# Patient Record
Sex: MALE | Race: WHITE | NOT HISPANIC OR LATINO | Employment: OTHER | URBAN - METROPOLITAN AREA
[De-identification: names, ages, dates, MRNs, and addresses within clinical notes are randomized per-mention and may not be internally consistent; named-entity substitution may affect disease eponyms.]

---

## 2021-08-09 ENCOUNTER — HISTORICAL (OUTPATIENT)
Dept: ADMINISTRATIVE | Facility: HOSPITAL | Age: 61
End: 2021-08-09

## 2021-08-09 LAB
ABS NEUT (OLG): 5.84 X10(3)/MCL (ref 2.1–9.2)
ALBUMIN SERPL-MCNC: 3.8 GM/DL (ref 3.4–4.8)
ALBUMIN/GLOB SERPL: 1.2 RATIO (ref 1.1–2)
ALP SERPL-CCNC: 90 UNIT/L (ref 40–150)
ALT SERPL-CCNC: 29 UNIT/L (ref 0–55)
AST SERPL-CCNC: 22 UNIT/L (ref 5–34)
BASOPHILS # BLD AUTO: 0.1 X10(3)/MCL (ref 0–0.2)
BASOPHILS NFR BLD AUTO: 1 %
BILIRUB SERPL-MCNC: 0.4 MG/DL
BILIRUBIN DIRECT+TOT PNL SERPL-MCNC: 0.1 MG/DL (ref 0–0.5)
BILIRUBIN DIRECT+TOT PNL SERPL-MCNC: 0.3 MG/DL (ref 0–0.8)
BUN SERPL-MCNC: 18.7 MG/DL (ref 8.4–25.7)
CALCIUM SERPL-MCNC: 9.2 MG/DL (ref 8.8–10)
CHLORIDE SERPL-SCNC: 104 MMOL/L (ref 98–107)
CHOLEST SERPL-MCNC: 214 MG/DL
CHOLEST/HDLC SERPL: 6 {RATIO} (ref 0–5)
CO2 SERPL-SCNC: 34 MMOL/L (ref 23–31)
CREAT SERPL-MCNC: 0.89 MG/DL (ref 0.73–1.18)
DEPRECATED CALCIDIOL+CALCIFEROL SERPL-MC: 56.5 NG/ML (ref 30–80)
EOSINOPHIL # BLD AUTO: 0.5 X10(3)/MCL (ref 0–0.9)
EOSINOPHIL NFR BLD AUTO: 6 %
ERYTHROCYTE [DISTWIDTH] IN BLOOD BY AUTOMATED COUNT: 13.5 % (ref 11.5–14.5)
EST CREAT CLEARANCE SER (OHS): 72.54 ML/MIN
EST. AVERAGE GLUCOSE BLD GHB EST-MCNC: 111.2 MG/DL
GLOBULIN SER-MCNC: 3.3 GM/DL (ref 2.4–3.5)
GLUCOSE SERPL-MCNC: 84 MG/DL (ref 82–115)
HBA1C MFR BLD: 5.5 %
HCT VFR BLD AUTO: 46.5 % (ref 40–51)
HDLC SERPL-MCNC: 38 MG/DL (ref 35–60)
HGB BLD-MCNC: 15.5 GM/DL (ref 13.5–17.5)
IMM GRANULOCYTES # BLD AUTO: 0.06 10*3/UL
IMM GRANULOCYTES NFR BLD AUTO: 1 %
LDLC SERPL CALC-MCNC: 97 MG/DL (ref 50–140)
LYMPHOCYTES # BLD AUTO: 1.4 X10(3)/MCL (ref 0.6–4.6)
LYMPHOCYTES NFR BLD AUTO: 16 %
MCH RBC QN AUTO: 31.3 PG (ref 26–34)
MCHC RBC AUTO-ENTMCNC: 33.3 GM/DL (ref 31–37)
MCV RBC AUTO: 93.9 FL (ref 80–100)
MONOCYTES # BLD AUTO: 0.8 X10(3)/MCL (ref 0.1–1.3)
MONOCYTES NFR BLD AUTO: 9 %
NEUTROPHILS # BLD AUTO: 5.84 X10(3)/MCL (ref 2.1–9.2)
NEUTROPHILS NFR BLD AUTO: 67 %
NRBC BLD AUTO-RTO: 0 % (ref 0–0.2)
PLATELET # BLD AUTO: 156 X10(3)/MCL (ref 130–400)
PMV BLD AUTO: 9.4 FL (ref 7.4–10.4)
POTASSIUM SERPL-SCNC: 4.2 MMOL/L (ref 3.5–5.1)
PROT SERPL-MCNC: 7.1 GM/DL (ref 5.8–7.6)
PSA SERPL-MCNC: 2.7 NG/ML
RBC # BLD AUTO: 4.95 X10(6)/MCL (ref 4.5–5.9)
SODIUM SERPL-SCNC: 141 MMOL/L (ref 136–145)
TRIGL SERPL-MCNC: 395 MG/DL (ref 34–140)
TSH SERPL-ACNC: 1.5 UIU/ML (ref 0.35–4.94)
VLDLC SERPL CALC-MCNC: 79 MG/DL
WBC # SPEC AUTO: 8.7 X10(3)/MCL (ref 4.5–11)

## 2022-04-10 ENCOUNTER — HISTORICAL (OUTPATIENT)
Dept: ADMINISTRATIVE | Facility: HOSPITAL | Age: 62
End: 2022-04-10
Payer: MEDICAID

## 2022-04-26 VITALS
DIASTOLIC BLOOD PRESSURE: 78 MMHG | WEIGHT: 135.56 LBS | HEIGHT: 64 IN | SYSTOLIC BLOOD PRESSURE: 116 MMHG | BODY MASS INDEX: 23.14 KG/M2 | OXYGEN SATURATION: 97 %

## 2022-05-03 NOTE — HISTORICAL [facility] CERNER
This is a historical note converted from Noé. Formatting and pictures may have been removed.  Please reference Noé for original formatting and attached multimedia. Chief Complaint  3 month follow up COPD, HTN. c/o fatigue. c/o SOB  History of Present Illness  59 yo male with sig pmh of aortic regurg, HTN, COPD, Sleep apnea, aneurysm on aortic valve here for a follow up  ?  patient has no concerns or complaints today  states that he was not able to get his blood work done the last visit and would like to get them today  ?   colon cancer screening, does not want?coloscopy,? would like FIT  does not want shingles, tdap, or covid vaccine  ?   saw pulmonology 3 weeks ago, no changes in medication changes, continues to have SOB but remains very active at home, mows his yard with a push mower and works out with his step machine  ?  pulmonologist- Anisha;  Psychiatrist- James;  Cardiology: Janae  Review of Systems  CONSTITUTIONAL: No weight loss, fever, chills,?or weakness. ?  HEENT: Eyes: No visual loss or blurred vision.  Ears, Nose, Throat: No congestion, runny nose or sore throat.?  SKIN: No rash or itching.?  CARDIOVASCULAR: No chest pain, chest pressure, or chest discomfort. No palpitations.?  RESPIRATORY:?+ shortness of breath, chronic cough  GASTROINTESTINAL: No nausea, vomiting or diarrhea. No abdominal pain, incontinence  GENITOURINARY: No pain, burning, or increased frequency or urgency?on urination.  Physical Exam  Vitals & Measurements  T:?37.1? ?C (Oral)? HR:?72(Peripheral)? RR:?19? BP:?100/63? SpO2:?96%?  HT:?163.00?cm? WT:?60.000?kg? BMI:?22.58?  General: appears well, in no acute distress  Eye: no scleral icterus  HENT: MMM  Neck: supple, no carotid bruits  Respiratory: clear to auscultation bilaterally, nonlabored respirations  Cardiovascular: regular rate and rhythm without murmurs or?gallops, no edema in bilateral lower extremities  Assessment/Plan  1.?HTN (hypertension)?I10  -continue  lisinopril 5mg daily, furosemide 20mg daily, Metoprolol tartrate 25mg BID  -continue following cardiology Dr. Cardoso  Ordered:  Comprehensive Metabolic Panel, Routine collect, 08/09/21 9:26:00 CDT, Blood, Stop date 08/09/21 9:26:00 CDT, Lab Collect, Venous Draw, HTN (hypertension), 08/09/21 9:13:00 CDT  Hemoglobin A1C UH, Routine collect, 08/09/21 9:26:00 CDT, Blood, Stop date 08/09/21 9:26:00 CDT, Lab Collect, Venous Draw, HTN (hypertension), 08/09/21 9:13:00 CDT  Lipid Panel, Routine collect, 08/09/21 9:26:00 CDT, Blood, Stop date 08/09/21 9:26:00 CDT, Lab Collect, Venous Draw, HTN (hypertension), 08/09/21 9:13:00 CDT  Prostate Specific Antigen, Routine collect, 08/09/21 9:26:00 CDT, Blood, Stop date 08/09/21 9:26:00 CDT, Lab Collect, Venous Draw, HTN (hypertension), 08/09/21 9:13:00 CDT  Thyroid Stimulating Hormone, Routine collect, 08/09/21 9:26:00 CDT, Blood, Stop date 08/09/21 9:26:00 CDT, Lab Collect, Venous Draw, HTN (hypertension), 08/09/21 9:13:00 CDT  Vitamin D, 25-Hydroxy Level, Routine collect, 08/09/21 9:26:00 CDT, Blood, Stop date 08/09/21 9:26:00 CDT, Lab Collect, Venous Draw, HTN (hypertension), 08/09/21 9:13:00 CDT  ?  3.?Sleep apnea?G47.30  ?-continue CPA  Ordered:  Clinic Follow up, *Est. 11/09/21 8:00:00 CST, Order for future visit, Sleep apnea, [facility]  ?  4.?Colon cancer screening?Z12.11  ?-FIT given to patient today  Ordered:  Occult Blood Fecal Immunoassay, Routine collect, Stool, Order for future visit, 08/09/21 9:17:00 CDT, Stop date 08/09/21 9:17:00 CDT, Nurse collect, Colon cancer screening  ?  5.?Prostate cancer screening?Z12.5  ?-PSA today  ?  RTC in 3 months with PCP Dr. Cali  ?  Stella Silva MD  [facility] -2  Referrals  Clinic Follow up, *Est. 11/09/21 8:00:00 CST, Order for future visit, Sleep apnea, [facility]   Problem List/Past Medical History  Ongoing  Aortic regurgitation  COPD type A  Establishing care with new doctor,  encounter for  HTN (hypertension)  Sleep apnea  Historical  No qualifying data  Procedure/Surgical History  Tonsils and adenoids   Medications  albuterol 90 mcg/inh inhalation aerosol, 1 puff(s), INH, Once, PRN  Lasix 20 mg oral tablet, 20 mg= 1 tab(s), Oral, Daily  lisinopril 5 mg oral tablet, 5 mg= 1 tab(s), Oral, Daily  LORazepam 1 mg oral tablet, 1 mg= 1 tab(s), Oral, BID, PRN  metoprolol tartrate 25 mg oral tab, 25 mg= 1 tab(s), Oral, BID  Spiriva HandiHaler 18 mcg inhalation capsule, 18 mcg= 1 cap(s), INH, Daily  Symbicort 160 mcg-4.5 mcg/inh inhalation aerosol, 2 puff(s), INH, BID  traZODone 100 mg oral tablet, 100 mg= 1 tab(s), Oral, Once a day (at bedtime), PRN  Allergies  No Known Medication Allergies  Social History  Alcohol  Past, 05/06/2021  Nutrition/Health  Regular, Good, 05/06/2021  Tobacco  Former smoker, quit more than 30 days ago, N/A, Household tobacco concerns: No. Smokeless Tobacco Use: Never., 08/09/2021  Former smoker, quit more than 30 days ago, No, 05/06/2021      Faculty addendum: Patient discussed with resident. ?Chart was reviewed including vitals, labs, etc. Care provided reasonable and necessary.?I participated in the management of the patient and was immediately available throughout the encounter. Services were furnished in a primary care center located in the outpatient department of a [facility] hospital. I agree with the residents findings and plan as documented in the residents note.?

## 2022-05-30 ENCOUNTER — OFFICE VISIT (OUTPATIENT)
Dept: FAMILY MEDICINE | Facility: CLINIC | Age: 62
End: 2022-05-30
Payer: MEDICAID

## 2022-05-30 VITALS
OXYGEN SATURATION: 96 % | DIASTOLIC BLOOD PRESSURE: 67 MMHG | WEIGHT: 138.25 LBS | RESPIRATION RATE: 18 BRPM | SYSTOLIC BLOOD PRESSURE: 105 MMHG | HEART RATE: 72 BPM | BODY MASS INDEX: 23.6 KG/M2

## 2022-05-30 DIAGNOSIS — G47.30 SLEEP APNEA, UNSPECIFIED TYPE: ICD-10-CM

## 2022-05-30 DIAGNOSIS — N39.0 URINARY TRACT INFECTION WITHOUT HEMATURIA, SITE UNSPECIFIED: Primary | ICD-10-CM

## 2022-05-30 DIAGNOSIS — I10 HYPERTENSION, UNSPECIFIED TYPE: ICD-10-CM

## 2022-05-30 PROBLEM — I35.1 AORTIC VALVE REGURGITATION: Status: ACTIVE | Noted: 2022-05-30

## 2022-05-30 PROBLEM — J43.9 PULMONARY EMPHYSEMA: Status: ACTIVE | Noted: 2022-05-30

## 2022-05-30 PROBLEM — I25.10 CORONARY ARTERY DISEASE INVOLVING NATIVE CORONARY ARTERY OF NATIVE HEART WITHOUT ANGINA PECTORIS: Status: ACTIVE | Noted: 2020-01-29

## 2022-05-30 PROBLEM — I42.0 DILATED CARDIOMYOPATHY: Status: ACTIVE | Noted: 2020-01-29

## 2022-05-30 PROBLEM — I71.9 AORTIC ANEURYSM: Status: ACTIVE | Noted: 2020-01-27

## 2022-05-30 PROBLEM — I50.9 CHF (CONGESTIVE HEART FAILURE): Status: ACTIVE | Noted: 2020-01-27

## 2022-05-30 PROCEDURE — 99213 OFFICE O/P EST LOW 20 MIN: CPT | Mod: PBBFAC

## 2022-05-30 RX ORDER — METOPROLOL TARTRATE 25 MG/1
25 TABLET, FILM COATED ORAL
COMMUNITY
Start: 2021-05-06 | End: 2024-03-07

## 2022-05-30 RX ORDER — TRAZODONE HYDROCHLORIDE 100 MG/1
100 TABLET ORAL
COMMUNITY
Start: 2021-05-06 | End: 2023-01-05

## 2022-05-30 RX ORDER — LORAZEPAM 1 MG/1
1 TABLET ORAL
COMMUNITY
Start: 2021-05-06 | End: 2024-03-07

## 2022-05-30 RX ORDER — ALBUTEROL SULFATE 90 UG/1
AEROSOL, METERED RESPIRATORY (INHALATION)
COMMUNITY
Start: 2021-05-06

## 2022-05-30 RX ORDER — LISINOPRIL 5 MG/1
5 TABLET ORAL
COMMUNITY
Start: 2021-05-06

## 2022-05-30 RX ORDER — TIOTROPIUM BROMIDE 18 UG/1
18 CAPSULE ORAL; RESPIRATORY (INHALATION)
COMMUNITY
Start: 2021-05-06 | End: 2024-03-07

## 2022-05-30 RX ORDER — BUDESONIDE AND FORMOTEROL FUMARATE DIHYDRATE 160; 4.5 UG/1; UG/1
AEROSOL RESPIRATORY (INHALATION)
COMMUNITY
Start: 2021-05-06

## 2022-05-30 RX ORDER — FUROSEMIDE 20 MG/1
20 TABLET ORAL
COMMUNITY
Start: 2021-05-06

## 2022-05-30 NOTE — PROGRESS NOTES
Subjective:       Patient ID: Lazaro Pacheco is a 61 y.o. male.    Chief Complaint: Follow-up (UTI)    HPI   UT/epididymitis: had enlarged testicle, went to urgent care and [facility], prescribed bactrim which caused him to swell in his legs, was switched to Cephalexin which he completed, has referral into urology placed by Fiordaliza    HTN: taking medications as prescribed  Sleep apnea: wear cpap nightly without issues    Pulmonologist- Anisha  Psychiatrist- [place]  Cardiology: Janae    Review of Systems   Constitutional: Negative for activity change and unexpected weight change.   HENT: Negative for hearing loss, rhinorrhea and trouble swallowing.    Eyes: Negative for discharge and visual disturbance.   Respiratory: Negative for chest tightness and wheezing.    Cardiovascular: Negative for chest pain and palpitations.   Gastrointestinal: Negative for blood in stool, constipation, diarrhea and vomiting.   Endocrine: Negative for polydipsia and polyuria.   Genitourinary: Negative for difficulty urinating, hematuria and urgency.   Musculoskeletal: Negative for arthralgias, joint swelling and neck pain.   Neurological: Negative for weakness and headaches.   Psychiatric/Behavioral: Negative for confusion and dysphoric mood.         Objective:      Blood pressure 105/67, pulse 72, resp. rate 18, weight 62.7 kg (138 lb 3.7 oz), SpO2 96 %.   Physical Exam    General: appears well, in no acute distress  Respiratory: clear to auscultation bilaterally, nonlabored respirations  Cardiovascular: regular rate and rhythm without murmurs, no pretibial edema  Gastrointestinal: soft, non-tender, non-distended, bowel sounds present    Assessment/Plan:  Problem List Items Addressed This Visit        Cardiac/Vascular    Hypertension       Other    Sleep apnea      Other Visit Diagnoses     Urinary tract infection without hematuria, site unspecified    -  Primary         UTI symptoms resolving, referral into urology placed with  Fiordaliza  Medications prescribed by specialist  Wearing CPAP nightly, no issues    RTC 3mo    Adwoa Cali DO  [employer]  Resident HO-3

## 2022-07-25 NOTE — PROGRESS NOTES
Discussed pt's Sx and plan of care at length w/ patient. ACC RN not able to exactly confirm source of discomfort - pt agrees that general area is middle upper abdomen, however throughout conversation, pt initially denies having CP, then stated she did have CP 4 days ago (only lasting a few minutes). Pt also repeatedly voiced a lot of concern about her heart (d/t previous stent placement) although continued to deny that discomfort was in the chest, but persistently worried about her heart. Pt initially triaged w/ 24 hrs disposition. Pt then confirmed new onset (approx 2 months ago) of dry intermittent cough, worse at nights, which disqualifies pt from an [facility] appointment. Pt called b/c she wanted to be seen by PCP as soon as possible in the office. RN advised pt to call son and have him drive her to ER today. Pt in agreement and confirmed she will go the hospital ER (most likely [facility]) today after calling son. Please call pt to schedule a follow up appointment as this is what she really wants w/ PCP. Thank you,  Yvette Salazar RN, ACC. [facility]  Office Visit Note    Subjective:      Patient ID: Lazaro Pacheco, : 1960.    Chief Complaint: Hospital f/u (Covid+)      61 y.o. male presents for hospital f/u.    Patient was hospitalized 7/10/2022 through 2022 at [facility] for COVID infection in the setting of Hx of nonoperable AAA, HFrEF, and COPD.  He was initially diagnosed with COVID on 7/3/2022 and had a waxing and waning course for a week.  He went to urgent care on 7/10/2022 when he despite a fever and was subsequently admitted to [facility] for 2 days.  Patient was seen by his cardiologist post discharge and advised to follow-up with PCP due to recent hospitalization.  Patient was started on home O2 at time of discharge as his O2 sats were decreased from baseline of 92-93%.  Completed 7 day course of Decadron 6 mg daily.  Metoprolol tartrate 25 mg b.i.d. changed to succinate 25 mg b.i.d. on 7/15/2022.  Reports O2 level at home has returned to low to mid 90s on room air.  Continues to have mild fatigue, loss of sense of taste and smell, & non-productive cough.  Using inhalers p.r.n. for dyspnea on exertion which is nearly back to baseline.  Continues to use albuterol rescue inhaler p.r.n..  Continued on Spiriva 18 mcg 1 daily, Symbicort 160-4.5 mcg 2 puffs b.i.d..  Patient was supposed to have appointment with his pulmonologist tomorrow, 2022.  However, the visit is being rescheduled to a later date.      ROS  Constitutional:  Denies weakness  ENT:  Denies earache, nasal congestion or rhinorrhea, or pharyngitis  Respiratory:  As per HPI  CV:  Denies chest pain, palpitations, syncope, or peripheral edema   GI:  Denies nausea or vomiting  Neuro:  Denies headache      Current Outpatient Medications:     albuterol (PROVENTIL/VENTOLIN HFA) 90 mcg/actuation inhaler, Inhale into the lungs., Disp: , Rfl:     budesonide-formoterol 160-4.5 mcg (SYMBICORT) 160-4.5 mcg/actuation HFAA, Inhale into the lungs., Disp: , Rfl:     furosemide (LASIX) 20 MG  "tablet, Take 20 mg by mouth., Disp: , Rfl:     lisinopriL (PRINIVIL,ZESTRIL) 5 MG tablet, Take 5 mg by mouth., Disp: , Rfl:     LORazepam (ATIVAN) 1 MG tablet, Take 1 mg by mouth., Disp: , Rfl:     metoprolol tartrate (LOPRESSOR) 25 MG tablet, Take 25 mg by mouth., Disp: , Rfl:     tiotropium (SPIRIVA WITH HANDIHALER) 18 mcg inhalation capsule, Inhale 18 mcg into the lungs., Disp: , Rfl:     traZODone (DESYREL) 100 MG tablet, Take 100 mg by mouth., Disp: , Rfl:     Objective:     PHYSICAL EXAM  Blood pressure 101/68, pulse 79, temperature 98.1 °F (36.7 °C), temperature source Oral, resp. rate 18, height 5' 4" (1.626 m), weight 60.1 kg (132 lb 6.4 oz), SpO2 96 %.    General:  Pleasant male, alert and oriented, NAD   Head: Normocephalic  Neck: Supple  Chest: Respirations nonlabored on room air; mild, and generalized rhonchi without wheezes, crackles, or other adventitious lung sounds  CV: RRR, no r/g/m  Abdomen: Soft, nontender, nondistended, normoactive bowel sounds, no renal bruit  Extremities: Distal pulses intact, no edema  Integumentary:  No rash to exposed skin of face and extremities.      Assessment:     1. Hospital discharge follow-up         Plan:     Doing well post discharge.  COPD and HFrEF stable.  Advised to use home O2 only if SpO2 decreases below 90 and to titrate levels to maintain sats no greater than 92% given Hx of COPD.  Pt will follow up with pulmonologist for additional recommendations.      Follow up in about 6 weeks (around 9/6/2022) for HM with PCP, Dr. Silva.    Gonzalez Kimball MD  -III  [facility]      No orders of the defined types were placed in this encounter.      New Prescriptions    No medications on file     Discontinued Medications    METOPROLOL SUCCINATE (TOPROL-XL) 25 MG 24 HR TABLET    Take 25 mg by mouth 2 (two) times daily.     Modified Medications    No medications on file       "

## 2022-07-26 ENCOUNTER — OFFICE VISIT (OUTPATIENT)
Dept: FAMILY MEDICINE | Facility: CLINIC | Age: 62
End: 2022-07-26
Payer: MEDICAID

## 2022-07-26 VITALS
HEIGHT: 64 IN | OXYGEN SATURATION: 96 % | SYSTOLIC BLOOD PRESSURE: 101 MMHG | DIASTOLIC BLOOD PRESSURE: 68 MMHG | WEIGHT: 132.38 LBS | RESPIRATION RATE: 18 BRPM | TEMPERATURE: 98 F | HEART RATE: 79 BPM | BODY MASS INDEX: 22.6 KG/M2

## 2022-07-26 DIAGNOSIS — Z09 HOSPITAL DISCHARGE FOLLOW-UP: Primary | ICD-10-CM

## 2022-07-26 PROCEDURE — 99214 OFFICE O/P EST MOD 30 MIN: CPT | Mod: PBBFAC

## 2022-07-26 RX ORDER — METOPROLOL SUCCINATE 25 MG/1
25 TABLET, EXTENDED RELEASE ORAL 2 TIMES DAILY
COMMUNITY
Start: 2022-07-15 | End: 2022-07-26 | Stop reason: ALTCHOICE

## 2022-07-26 NOTE — PROGRESS NOTES
I reviewed History, PE, A/P and chart was reviewed.  Services provided in a teaching facility, I was immediately available  I agree with resident, care reasonable and appropriate.

## 2023-01-05 ENCOUNTER — OFFICE VISIT (OUTPATIENT)
Dept: FAMILY MEDICINE | Facility: CLINIC | Age: 63
End: 2023-01-05
Payer: MEDICAID

## 2023-01-05 VITALS
RESPIRATION RATE: 18 BRPM | WEIGHT: 134.19 LBS | OXYGEN SATURATION: 97 % | HEART RATE: 72 BPM | TEMPERATURE: 98 F | HEIGHT: 64 IN | BODY MASS INDEX: 22.91 KG/M2 | DIASTOLIC BLOOD PRESSURE: 71 MMHG | SYSTOLIC BLOOD PRESSURE: 106 MMHG

## 2023-01-05 DIAGNOSIS — I10 HYPERTENSION, UNSPECIFIED TYPE: ICD-10-CM

## 2023-01-05 DIAGNOSIS — G47.00 INSOMNIA, UNSPECIFIED TYPE: Primary | ICD-10-CM

## 2023-01-05 DIAGNOSIS — Z12.5 SCREENING FOR PROSTATE CANCER: ICD-10-CM

## 2023-01-05 DIAGNOSIS — G47.30 SLEEP APNEA, UNSPECIFIED TYPE: ICD-10-CM

## 2023-01-05 LAB
ALBUMIN SERPL-MCNC: 3.9 G/DL (ref 3.4–4.8)
ALBUMIN/GLOB SERPL: 1.4 RATIO (ref 1.1–2)
ALP SERPL-CCNC: 93 UNIT/L (ref 40–150)
ALT SERPL-CCNC: 27 UNIT/L (ref 0–55)
AST SERPL-CCNC: 24 UNIT/L (ref 5–34)
BASOPHILS # BLD AUTO: 0.06 X10(3)/MCL (ref 0–0.2)
BASOPHILS NFR BLD AUTO: 0.9 %
BILIRUBIN DIRECT+TOT PNL SERPL-MCNC: 0.5 MG/DL
BUN SERPL-MCNC: 12.6 MG/DL (ref 8.4–25.7)
CALCIUM SERPL-MCNC: 8.8 MG/DL (ref 8.8–10)
CHLORIDE SERPL-SCNC: 104 MMOL/L (ref 98–107)
CHOLEST SERPL-MCNC: 230 MG/DL
CHOLEST/HDLC SERPL: 5 {RATIO} (ref 0–5)
CO2 SERPL-SCNC: 26 MMOL/L (ref 23–31)
CREAT SERPL-MCNC: 0.85 MG/DL (ref 0.73–1.18)
EOSINOPHIL # BLD AUTO: 0.43 X10(3)/MCL (ref 0–0.9)
EOSINOPHIL NFR BLD AUTO: 6.5 %
ERYTHROCYTE [DISTWIDTH] IN BLOOD BY AUTOMATED COUNT: 13.3 % (ref 11.6–14.4)
EST. AVERAGE GLUCOSE BLD GHB EST-MCNC: 111.2 MG/DL
GFR SERPLBLD CREATININE-BSD FMLA CKD-EPI: >90 MLS/MIN/1.73/M2
GLOBULIN SER-MCNC: 2.8 GM/DL (ref 2.4–3.5)
GLUCOSE SERPL-MCNC: 82 MG/DL (ref 82–115)
HBA1C MFR BLD: 5.5 %
HCT VFR BLD AUTO: 46.4 % (ref 42–52)
HDLC SERPL-MCNC: 44 MG/DL (ref 35–60)
HGB BLD-MCNC: 15.2 GM/DL (ref 14–18)
IMM GRANULOCYTES # BLD AUTO: 0.03 X10(3)/MCL (ref 0–0.04)
IMM GRANULOCYTES NFR BLD AUTO: 0.5 %
LDLC SERPL CALC-MCNC: 133 MG/DL (ref 50–140)
LYMPHOCYTES # BLD AUTO: 1.69 X10(3)/MCL (ref 0.6–4.6)
LYMPHOCYTES NFR BLD AUTO: 25.4 %
MCH RBC QN AUTO: 30.4 PG
MCHC RBC AUTO-ENTMCNC: 32.8 MG/DL (ref 33–36)
MCV RBC AUTO: 92.8 FL (ref 80–94)
MONOCYTES # BLD AUTO: 0.93 X10(3)/MCL (ref 0.1–1.3)
MONOCYTES NFR BLD AUTO: 14 %
NEUTROPHILS # BLD AUTO: 3.52 X10(3)/MCL (ref 2.1–9.2)
NEUTROPHILS NFR BLD AUTO: 52.7 %
NRBC BLD AUTO-RTO: 0 % (ref 0–1)
PLATELET # BLD AUTO: 130 X10(3)/MCL (ref 140–371)
PLATELETS.RETICULATED NFR BLD AUTO: 4 % (ref 0.9–11.2)
PMV BLD AUTO: 10 FL (ref 9.4–12.4)
POTASSIUM SERPL-SCNC: 4.1 MMOL/L (ref 3.5–5.1)
PROT SERPL-MCNC: 6.7 GM/DL (ref 5.8–7.6)
PSA SERPL-MCNC: 1.03 NG/ML
RBC # BLD AUTO: 5 X10(6)/MCL (ref 4.7–6.1)
SODIUM SERPL-SCNC: 140 MMOL/L (ref 136–145)
TRIGL SERPL-MCNC: 266 MG/DL (ref 34–140)
VLDLC SERPL CALC-MCNC: 53 MG/DL
WBC # SPEC AUTO: 6.7 X10(3)/MCL (ref 4.5–11.5)

## 2023-01-05 PROCEDURE — 85025 COMPLETE CBC W/AUTO DIFF WBC: CPT

## 2023-01-05 PROCEDURE — 36415 COLL VENOUS BLD VENIPUNCTURE: CPT

## 2023-01-05 PROCEDURE — 80061 LIPID PANEL: CPT

## 2023-01-05 PROCEDURE — 84153 ASSAY OF PSA TOTAL: CPT

## 2023-01-05 PROCEDURE — 83036 HEMOGLOBIN GLYCOSYLATED A1C: CPT

## 2023-01-05 PROCEDURE — 99214 OFFICE O/P EST MOD 30 MIN: CPT | Mod: PBBFAC

## 2023-01-05 PROCEDURE — 80053 COMPREHEN METABOLIC PANEL: CPT

## 2023-01-05 RX ORDER — METOPROLOL SUCCINATE 25 MG/1
25 TABLET, EXTENDED RELEASE ORAL 2 TIMES DAILY
COMMUNITY
Start: 2022-09-07

## 2023-01-05 RX ORDER — TRAZODONE HYDROCHLORIDE 50 MG/1
25-50 TABLET ORAL NIGHTLY
COMMUNITY
Start: 2022-12-09

## 2023-01-05 NOTE — PROGRESS NOTES
[facility] OFFICE VISIT NOTE  Lazaro Pacheco  [id number]  01/05/2023      Chief Complaint: routine follow up       HPI    Lazaro Pacheco is a 61 y.o. male with sig pmh of HTN, CHF, Aortic Regurg, Aortic Aneurysm, DUSTY, and COPD presenting to [facility] for a follow up of chronic conditions     Insomnia   -compliant with Trazodone 100mg qhs  -sleeping well throughout the night    Sleep apnea  -wearing cpap nightly without issues     -in the past has declined colonoscopy.  Sister with history of colon cancer.  Spoke to patient about needed colonoscopy but declined.  Risk vs benefit discussed with patient.   -declined shingrix, TDAP, flu and COVID vaccines.  Risks vs benefit discussed.        HTN  CHF  -lisinopril 5mg qd, metoprolol tartrate 25mg bid and lasix 20mg qd  -follows Dr. Cardoso (Cardiologist)   -medications prescribed by cardiology     COPD  -follows pulmonology Dr. Lancaster  -compliant with medications: Symbicort and albuterol      Pulmonologist- Anisha  Psychiatrist- [place]  Cardiology: Janae      ROS:  CONSTITUTIONAL: No weight loss, fever, chills, or weakness.    CARDIOVASCULAR: No chest pain, chest pressure, or chest discomfort. No palpitations.    RESPIRATORY: No shortness of breath, cough, or sputum.    GASTROINTESTINAL: No nausea, vomiting or diarrhea. No abdominal pain.      Vitals:    01/05/23 1518   BP: 106/71   Pulse: 72   Resp: 18   Temp: 97.5 °F (36.4 °C)        PE:  General: appears well, in no acute distress   Eye: no scleral icterus   HENT: MMM  Respiratory: clear to auscultation bilaterally, nonlabored respirations   Cardiovascular: regular rate and rhythm without murmurs or gallops, no edema in bilateral lower extremities       Current Medications:   Current Outpatient Medications   Medication Sig Dispense Refill    albuterol (PROVENTIL/VENTOLIN HFA) 90 mcg/actuation inhaler Inhale into the lungs.      budesonide-formoterol 160-4.5 mcg (SYMBICORT) 160-4.5 mcg/actuation HFAA Inhale into the  lungs.      furosemide (LASIX) 20 MG tablet Take 20 mg by mouth.      lisinopriL (PRINIVIL,ZESTRIL) 5 MG tablet Take 5 mg by mouth.      LORazepam (ATIVAN) 1 MG tablet Take 1 mg by mouth.      metoprolol succinate (TOPROL-XL) 25 MG 24 hr tablet Take 25 mg by mouth 2 (two) times daily.      metoprolol tartrate (LOPRESSOR) 25 MG tablet Take 25 mg by mouth.      tiotropium (SPIRIVA WITH HANDIHALER) 18 mcg inhalation capsule Inhale 18 mcg into the lungs.      traZODone (DESYREL) 50 MG tablet Take 25-50 mg by mouth every evening.       No current facility-administered medications for this visit.       Assessment:   1. Insomnia, unspecified type    2. Hypertension, unspecified type    3. Screening for prostate cancer    4. Sleep apnea, unspecified type        Plan:  Insomina   -stable   -continue Trazodone 100mg qd     DUSTY  -stable  -continue cpap at night    HTN  -continue lisinopril 5mg qd, metoprolol tartrate 25mg bid and lasix 20mg qd  -continue following Dr. Cardoso (Cardiologist)      Prostate Cancer Screening  -PSA ordered today  -last PSA 2.7 on 8/9/2021    Return to clinic in 3 months   -screening labs ordered today CBC, CMP, lipid panel, a1c     Stella Silva M.D.  [facility]- 3

## 2023-01-06 ENCOUNTER — PATIENT MESSAGE (OUTPATIENT)
Dept: NEPHROLOGY | Facility: CLINIC | Age: 63
End: 2023-01-06
Payer: MEDICAID

## 2023-04-06 ENCOUNTER — OFFICE VISIT (OUTPATIENT)
Dept: FAMILY MEDICINE | Facility: CLINIC | Age: 63
End: 2023-04-06
Payer: MEDICAID

## 2023-04-06 VITALS
WEIGHT: 134.63 LBS | BODY MASS INDEX: 22.99 KG/M2 | DIASTOLIC BLOOD PRESSURE: 72 MMHG | SYSTOLIC BLOOD PRESSURE: 111 MMHG | HEIGHT: 64 IN | TEMPERATURE: 98 F | HEART RATE: 66 BPM | OXYGEN SATURATION: 95 % | RESPIRATION RATE: 18 BRPM

## 2023-04-06 DIAGNOSIS — G47.00 INSOMNIA, UNSPECIFIED TYPE: Primary | ICD-10-CM

## 2023-04-06 DIAGNOSIS — G47.30 SLEEP APNEA, UNSPECIFIED TYPE: ICD-10-CM

## 2023-04-06 PROCEDURE — 99214 OFFICE O/P EST MOD 30 MIN: CPT | Mod: PBBFAC

## 2023-04-06 NOTE — PROGRESS NOTES
I have reviewed the Resident's history and physical, assessment, plan, and progress note. I agree with the findings.       Michael Pringle MD  [facility]

## 2023-04-06 NOTE — PROGRESS NOTES
[facility] OFFICE VISIT NOTE  Lazaro Pacheco  [id number]  04/06/2023      Chief Complaint: Follow-up (3 Month Follow Up )      HPI    Lazaro Pacheco is a 62 y.o. male with sig pmh of HTN, CHF, Aortic Regurg, Aortic Aneurysm, DUSTY, and COPD presenting to [facility] for a follow up of chronic conditions     Insomnia   -compliant with Trazodone 100mg qhs  -sleeping well throughout the night    Sleep apnea  -wearing cpap nightly without issues        Pulmonologist- Anisha  Psychiatrist- [place]  Cardiology: Janae      ROS:  CONSTITUTIONAL: No weight loss, fever, chills, or weakness.    HEENT: Eyes: No visual loss or blurred vision.   CARDIOVASCULAR: No chest pain, chest pressure, or chest discomfort. No palpitations.    RESPIRATORY: No shortness of breath, cough, or sputum.    GASTROINTESTINAL: No nausea, vomiting or diarrhea. No abdominal pain.    Vitals:    04/06/23 1012   BP: 111/72   Pulse: 66   Resp: 18   Temp: 97.6 °F (36.4 °C)       PE:  General: appears well, in no acute distress   Eye: no scleral icterus   HENT: MMM  Respiratory: clear to auscultation bilaterally, nonlabored respirations   Cardiovascular: regular rate and rhythm without murmurs or gallops, no edema in bilateral lower extremities       Current Medications:   Current Outpatient Medications   Medication Sig Dispense Refill    albuterol (PROVENTIL/VENTOLIN HFA) 90 mcg/actuation inhaler Inhale into the lungs.      budesonide-formoterol 160-4.5 mcg (SYMBICORT) 160-4.5 mcg/actuation HFAA Inhale into the lungs.      furosemide (LASIX) 20 MG tablet Take 20 mg by mouth.      lisinopriL (PRINIVIL,ZESTRIL) 5 MG tablet Take 5 mg by mouth.      LORazepam (ATIVAN) 1 MG tablet Take 1 mg by mouth.      metoprolol succinate (TOPROL-XL) 25 MG 24 hr tablet Take 25 mg by mouth 2 (two) times daily.      metoprolol tartrate (LOPRESSOR) 25 MG tablet Take 25 mg by mouth.      tiotropium (SPIRIVA WITH HANDIHALER) 18 mcg inhalation capsule Inhale 18 mcg into the lungs.       traZODone (DESYREL) 50 MG tablet Take 25-50 mg by mouth every evening.       No current facility-administered medications for this visit.       Assessment:   1. Insomnia, unspecified type    2. Sleep apnea, unspecified type        Plan:  Insomina   -stable   -continue Trazodone 100mg qd     DUSTY  -stable  -continue cpap at night    Return to clinic in 3 months, or sooner if needed.   -at next apt collect HIV and hep c ab, patient agreeable.   -in the past has declined colonoscopy.  Sister with history of colon cancer.  Spoke to patient about needed colonoscopy but declined.  Risk vs benefit discussed with patient.   -declined shingrix, TDAP, flu and COVID vaccines.  Risks vs benefit discussed.     Stella Silva M.D.  [facility]

## 2023-06-21 ENCOUNTER — PATIENT MESSAGE (OUTPATIENT)
Dept: ADMINISTRATIVE | Facility: HOSPITAL | Age: 63
End: 2023-06-21
Payer: MEDICAID

## 2023-08-04 ENCOUNTER — OFFICE VISIT (OUTPATIENT)
Dept: FAMILY MEDICINE | Facility: CLINIC | Age: 63
End: 2023-08-04
Payer: MEDICAID

## 2023-08-04 VITALS
BODY MASS INDEX: 23.05 KG/M2 | TEMPERATURE: 98 F | DIASTOLIC BLOOD PRESSURE: 70 MMHG | OXYGEN SATURATION: 94 % | HEART RATE: 78 BPM | HEIGHT: 64 IN | WEIGHT: 135 LBS | SYSTOLIC BLOOD PRESSURE: 109 MMHG | RESPIRATION RATE: 18 BRPM

## 2023-08-04 DIAGNOSIS — I71.9 AORTIC ANEURYSM, UNSPECIFIED PORTION OF AORTA, UNSPECIFIED WHETHER RUPTURED: ICD-10-CM

## 2023-08-04 DIAGNOSIS — I10 HYPERTENSION, UNSPECIFIED TYPE: Primary | ICD-10-CM

## 2023-08-04 DIAGNOSIS — I35.1 AORTIC VALVE INSUFFICIENCY, ETIOLOGY OF CARDIAC VALVE DISEASE UNSPECIFIED: ICD-10-CM

## 2023-08-04 DIAGNOSIS — J43.1 PANLOBULAR EMPHYSEMA: ICD-10-CM

## 2023-08-04 DIAGNOSIS — Z12.11 COLON CANCER SCREENING: ICD-10-CM

## 2023-08-04 DIAGNOSIS — I50.20 HFREF (HEART FAILURE WITH REDUCED EJECTION FRACTION): ICD-10-CM

## 2023-08-04 PROCEDURE — 99214 OFFICE O/P EST MOD 30 MIN: CPT | Mod: PBBFAC | Performed by: STUDENT IN AN ORGANIZED HEALTH CARE EDUCATION/TRAINING PROGRAM

## 2023-08-04 NOTE — PROGRESS NOTES
Subjective:       Patient ID: Lazaro Pacheco is a 62 y.o. male.    Chief Complaint: routine f/u    HPI  62-year-old male with past medical history of HFrEF, aortic aneurysm, emphysematous COPD and hypertension (well controlled) presents to clinic to establish care with new PCP.     HFrEF: No longer on lifevest. Unable to see echo results. Previous 20% EF per chart review.  Adherent to medications.    COPD: Sees Dr. Lancaster. Adherent to meds prescribed.    HTN: Has aortic aneurysm. Not a candidate for surgery due to severity of lung disease.     HM:   Last PSA: 1.03 (1.5.23)  CRC: Cologuard previously performed, but lost.    Pulmonologist- Anisha  Psychiatrist- [place]  Cardiology: Janae    Review of Systems   Constitutional:  Negative for activity change and unexpected weight change.   HENT:  Negative for hearing loss, rhinorrhea and trouble swallowing.    Eyes:  Negative for discharge and visual disturbance.   Respiratory:  Negative for chest tightness and wheezing.    Cardiovascular:  Negative for chest pain and palpitations.   Gastrointestinal:  Negative for blood in stool, constipation, diarrhea and vomiting.   Endocrine: Negative for polydipsia and polyuria.   Genitourinary:  Negative for difficulty urinating, hematuria and urgency.   Musculoskeletal:  Negative for arthralgias, joint swelling and neck pain.   Neurological:  Negative for weakness and headaches.   Psychiatric/Behavioral:  Negative for confusion and dysphoric mood.        Objective:      Vitals:    08/04/23 1452   BP: 109/70   Pulse: 78   Resp: 18   Temp: 97.9 °F (36.6 °C)       Physical Exam  Constitutional:       General: He is not in acute distress.     Appearance: Normal appearance.   Eyes:      Extraocular Movements: Extraocular movements intact.   Cardiovascular:      Rate and Rhythm: Normal rate and regular rhythm.   Pulmonary:      Effort: Pulmonary effort is normal.      Breath sounds: No wheezing or rales.   Musculoskeletal:          General: Normal range of motion.   Skin:     General: Skin is warm.   Neurological:      Mental Status: He is alert.      Gait: Gait normal.         Assessment:       1. Hypertension, unspecified type    2. HFrEF (heart failure with reduced ejection fraction)    3. Aortic aneurysm, unspecified portion of aorta, unspecified whether ruptured    4. Aortic valve insufficiency, etiology of cardiac valve disease unspecified    5. Panlobular emphysema    6. Colon cancer screening        Plan:       1-4. Continue medications as prescribed. Avoid excessive salt/fluids. Followups with Cardiology. ED precautions. Continue to monitor closely.  5.    Continue followups with pulmonology.  Medications as prescribed.  No refills needed.  6.    New Cologuard kit to be sent to patient's home.      RTC in 6 months or sooner if needed.    Theodore Hein MD, MPH  [employer]-III

## 2023-08-24 LAB — NONINV COLON CA DNA+OCC BLD SCRN STL QL: NEGATIVE

## 2024-03-07 ENCOUNTER — OFFICE VISIT (OUTPATIENT)
Dept: FAMILY MEDICINE | Facility: CLINIC | Age: 64
End: 2024-03-07
Payer: MEDICARE

## 2024-03-07 VITALS
OXYGEN SATURATION: 94 % | RESPIRATION RATE: 18 BRPM | HEIGHT: 64 IN | SYSTOLIC BLOOD PRESSURE: 104 MMHG | HEART RATE: 78 BPM | TEMPERATURE: 97 F | WEIGHT: 130.63 LBS | BODY MASS INDEX: 22.3 KG/M2 | DIASTOLIC BLOOD PRESSURE: 69 MMHG

## 2024-03-07 DIAGNOSIS — Z28.21 VACCINATION NOT CARRIED OUT BECAUSE OF PATIENT REFUSAL: ICD-10-CM

## 2024-03-07 DIAGNOSIS — E78.5 HYPERLIPIDEMIA, UNSPECIFIED HYPERLIPIDEMIA TYPE: Primary | ICD-10-CM

## 2024-03-07 DIAGNOSIS — G47.9 SLEEP DISTURBANCES: ICD-10-CM

## 2024-03-07 DIAGNOSIS — Z79.899 CHRONIC PRESCRIPTION BENZODIAZEPINE USE: ICD-10-CM

## 2024-03-07 DIAGNOSIS — J43.9 PULMONARY EMPHYSEMA, UNSPECIFIED EMPHYSEMA TYPE: ICD-10-CM

## 2024-03-07 DIAGNOSIS — Z13.220 NEED FOR LIPID SCREENING: ICD-10-CM

## 2024-03-07 PROBLEM — Z86.16 HISTORY OF SEVERE ACUTE RESPIRATORY SYNDROME CORONAVIRUS 2 (SARS-COV-2) DISEASE: Status: ACTIVE | Noted: 2024-03-07

## 2024-03-07 PROBLEM — J44.9 CHRONIC OBSTRUCTIVE PULMONARY DISEASE: Status: ACTIVE | Noted: 2022-05-30

## 2024-03-07 PROCEDURE — 99214 OFFICE O/P EST MOD 30 MIN: CPT | Mod: PBBFAC | Performed by: STUDENT IN AN ORGANIZED HEALTH CARE EDUCATION/TRAINING PROGRAM

## 2024-03-07 RX ORDER — LORAZEPAM 2 MG/1
2 TABLET ORAL NIGHTLY
COMMUNITY

## 2024-03-07 RX ORDER — UMECLIDINIUM 62.5 UG/1
1 AEROSOL, POWDER ORAL DAILY
COMMUNITY
Start: 2024-02-01

## 2024-03-07 NOTE — PROGRESS NOTES
Subjective:       Patient ID: Lazaro Pacheco is a 63 y.o. male.    Chief Complaint: Follow-up (6 month follow up)    HPI  62 yo M presents with spouse for follow-up.    Acute Concerns: No acute concerns today.     Chronic: Ativan for 15 years. Increased to 2mg three years ago. Has been taking Trazodone over same time frame. Denies any side effects. Reports that he is unable to sleep at all without these meds.  Prescribed by Brittney Ramirez DNP.    COPD: Followed by Pulm at [facility]. Takes medications as prescribed. Uses CPAP consistently. No longer on Spiriva. Labs performed recently. Unable to see results in chart.    Elevated Tchol/TGL on labs one year ago. Has not been on any cholesterol lowering meds. Declines labs at this visit.    Healthcare Maintenance:   Vaccines recommended.    Review of Systems   Constitutional:  Negative for activity change and unexpected weight change.   HENT:  Negative for hearing loss, rhinorrhea and trouble swallowing.    Eyes:  Negative for discharge and visual disturbance.   Respiratory:  Negative for chest tightness and wheezing.    Cardiovascular:  Negative for chest pain and palpitations.   Gastrointestinal:  Negative for blood in stool, constipation, diarrhea and vomiting.   Endocrine: Negative for polydipsia and polyuria.   Genitourinary:  Negative for difficulty urinating, hematuria and urgency.   Musculoskeletal:  Negative for arthralgias, joint swelling and neck pain.   Neurological:  Negative for weakness and headaches.   Psychiatric/Behavioral:  Negative for confusion and dysphoric mood.        Objective:      Vitals:    03/07/24 1532   BP: 104/69   Pulse:    Resp:    Temp:        Physical Exam  Constitutional:       General: He is not in acute distress.     Appearance: He is not toxic-appearing.   Cardiovascular:      Rate and Rhythm: Normal rate and regular rhythm.   Pulmonary:      Effort: Pulmonary effort is normal.      Breath sounds: No wheezing or rhonchi.    Musculoskeletal:         General: Normal range of motion.   Skin:     General: Skin is warm.   Neurological:      Mental Status: He is alert.         Assessment:       1. Pulmonary emphysema, unspecified emphysema type    2. Need for lipid screening    3. Sleep disturbances    4. Chronic prescription benzodiazepine use    5. Vaccination not carried out because of patient refusal        Plan:   - Controlled. Continue medications and follow-ups with Pulm. Requested all available documentation from visits.  - Given heart and smoking history, recommend repeat lipid panel. He is unsure if lipids checked at recent Pulm visit. Awaiting labs from [facility]. Patient to send to [facility].  The 10-year ASCVD risk score (Jannie OCONNOR, et al., 2019) is: 12.3%    Values used to calculate the score:      Age: 63 years      Sex: Male      Is Non- : No      Diabetic: No      Tobacco smoker: No      Systolic Blood Pressure: 104 mmHg      Is BP treated: Yes      HDL Cholesterol: 44 mg/dL      Total Cholesterol: 230 mg/dL  - Had long discussion concerning Benzo and Trazodone use concomitantly in elderly. He states that he was unaware of possible side effects and will start to decrease Ativan use. Needs to discuss planned changes with Ms. Ramirez. Discussed sleep hygiene and continued use of CPAP.  - Declines all age-appropriate vaccines at this time. Address again at next visit.    RTC in 3 months or sooner if needed.    Theodore Hein MD, MPH  [facility]

## 2024-03-09 NOTE — PROGRESS NOTES
"I reviewed History, PE, A/P and medical record.  Services provided in outpatient department of a teaching hospital/facility, I was immediately available.  I agree with resident, care reasonable and necessary.   I evaluated the patient with resident at time of visit, participated in key parts of H/P and management was discussed.    Chart review  PMH  HTN HLD COPD (severe centrilobular per), )SA, CAD , CHF/dilated  cardiomyopathy(unquant, records not available) severe MR< + AV regurg, 4/7cm  aortic arch aneurysm ( saw Ketan Rojas 2020 for surg consult)    CUS (-) 2020  ASCVD score not needed  -pt is 2nd prevention due to CAD h/o, with LDL goal < 70, not on statin - need to coord care with CARD to see if intolerant/allergy  2019 [facility] done though [facility] - need records to review  Had severe COVID infection 2022      Care Team  CARD- Janae  CV surg  -saw Ketan Rojas 2020  PULDEBORAH Melchor  2/2024    Pt looked great at visit, no c/o but worried about how his new medicare will work and afraid to get bills he cannot afford, has seen CARD 1/2024 and advised he could call them to find out if his medicaid SNP covers the 20% balance   Appears sat 94% is close to his baseline, has had this reading several times  Declined PCV 20 bad others but stress need for PCV 20 again at FU  Unsure that severe MR. Aortic arch aneurysm was ever repaired- please inquire at FU  Please confirm tob h/o, we have "never" but care everywhere records- former smoker, with severe COPD at his  young age need to explore cause  Agree with need to wean Ativan, sterling given his COPD status and risk for resp failure  Weight loss - 5 pounds, need to explore    Will need to obtain records from CARD/PULM,CV surgeon before ordering add'l testing  Resident messaged to address weight loss, had ? Distal esophageal thickening on 7/2022 CTA, need to inquire if EGD done    Niyah Madrid MD  \[facility]\"" Family Medicine Residency - [facility]    "

## 2024-06-13 ENCOUNTER — OFFICE VISIT (OUTPATIENT)
Dept: FAMILY MEDICINE | Facility: CLINIC | Age: 64
End: 2024-06-13
Payer: MEDICARE

## 2024-06-13 VITALS
RESPIRATION RATE: 17 BRPM | BODY MASS INDEX: 22.74 KG/M2 | HEIGHT: 64 IN | OXYGEN SATURATION: 94 % | SYSTOLIC BLOOD PRESSURE: 105 MMHG | DIASTOLIC BLOOD PRESSURE: 67 MMHG | TEMPERATURE: 98 F | HEART RATE: 77 BPM | WEIGHT: 133.19 LBS

## 2024-06-13 DIAGNOSIS — I25.10 CORONARY ARTERY DISEASE INVOLVING NATIVE CORONARY ARTERY OF NATIVE HEART WITHOUT ANGINA PECTORIS: Primary | ICD-10-CM

## 2024-06-13 DIAGNOSIS — J43.9 PULMONARY EMPHYSEMA, UNSPECIFIED EMPHYSEMA TYPE: ICD-10-CM

## 2024-06-13 DIAGNOSIS — Z28.21 VACCINATION NOT CARRIED OUT BECAUSE OF PATIENT REFUSAL: ICD-10-CM

## 2024-06-13 DIAGNOSIS — I50.9 CONGESTIVE HEART FAILURE, UNSPECIFIED HF CHRONICITY, UNSPECIFIED HEART FAILURE TYPE: ICD-10-CM

## 2024-06-13 PROCEDURE — 99214 OFFICE O/P EST MOD 30 MIN: CPT | Mod: PBBFAC | Performed by: STUDENT IN AN ORGANIZED HEALTH CARE EDUCATION/TRAINING PROGRAM

## 2024-06-24 NOTE — PROGRESS NOTES
Subjective:       Patient ID: Lazaro Pacheco is a 63 y.o. male.    Chief Complaint: Hyperlipidemia (/) and Health Maintenance    Hyperlipidemia  Pertinent negatives include no chest pain.     62yo M returns with wife for routine visit.   HLD: Still awaiting most recent labs from Cardiology.  Expressing concerns about his heart and lung function. Feels as if things won't get better.  He keeps all appointments and takes medications as prescribed.   No acute concerns at this time.    HM:   Vaccines recommended: PCV, Tetanus, Shingles    Review of Systems   Constitutional:  Negative for activity change and unexpected weight change.   HENT:  Negative for hearing loss and trouble swallowing.    Eyes:  Negative for discharge and visual disturbance.   Respiratory:  Negative for chest tightness and wheezing.    Cardiovascular:  Negative for chest pain and palpitations.   Gastrointestinal:  Negative for blood in stool, constipation, diarrhea and vomiting.   Endocrine: Negative for polydipsia and polyuria.   Genitourinary:  Negative for difficulty urinating, hematuria and urgency.   Musculoskeletal:  Negative for arthralgias, joint swelling and neck pain.   Neurological:  Negative for weakness and headaches.   Psychiatric/Behavioral:  Negative for confusion and dysphoric mood.      MD Reviewed patient provided responses.  Objective:      Vitals:    06/13/24 1252   BP: 105/67   Pulse: 77   Resp: 17   Temp: 97.6 °F (36.4 °C)       Physical Exam  Cardiovascular:      Rate and Rhythm: Normal rate.   Pulmonary:      Effort: Pulmonary effort is normal.      Breath sounds: No wheezing, rhonchi or rales.   Musculoskeletal:         General: Normal range of motion.   Skin:     General: Skin is warm.   Neurological:      Mental Status: He is alert.      Gait: Gait normal.           Assessment:       1. Coronary artery disease involving native coronary artery of native heart without angina pectoris    2. Congestive heart failure, unspecified  HF chronicity, unspecified heart failure type    3. Pulmonary emphysema, unspecified emphysema type    4. Vaccination not carried out because of patient refusal        Plan:   Emphasized need for recent labs from [facility]. Need lipid panel ASAP. Continue f/u with Dr. Cardoso/[facility]. Medications as prescribed.  As above. Unable to see any Echo results. Will request.  Continue follow-up with Dr. Us. Meds as prescribed.  Declines all vaccinations.    Future Appointments   Date Time Provider Department Center   9/26/2024 10:20 AM [place] Puga MD [facility] Un     Theodore Hein MD, MPH  U  [facility]

## 2024-11-04 ENCOUNTER — OFFICE VISIT (OUTPATIENT)
Dept: FAMILY MEDICINE | Facility: CLINIC | Age: 64
End: 2024-11-04
Payer: MEDICARE

## 2024-11-04 ENCOUNTER — TELEPHONE (OUTPATIENT)
Dept: FAMILY MEDICINE | Facility: CLINIC | Age: 64
End: 2024-11-04
Payer: MEDICARE

## 2024-11-04 VITALS
SYSTOLIC BLOOD PRESSURE: 99 MMHG | WEIGHT: 135.19 LBS | HEIGHT: 64 IN | DIASTOLIC BLOOD PRESSURE: 64 MMHG | BODY MASS INDEX: 23.08 KG/M2 | OXYGEN SATURATION: 94 % | TEMPERATURE: 98 F | HEART RATE: 77 BPM

## 2024-11-04 DIAGNOSIS — Z11.4 ENCOUNTER FOR SCREENING FOR HIV: ICD-10-CM

## 2024-11-04 DIAGNOSIS — Z79.899 CHRONIC PRESCRIPTION BENZODIAZEPINE USE: ICD-10-CM

## 2024-11-04 DIAGNOSIS — R53.82 CHRONIC FATIGUE: Primary | ICD-10-CM

## 2024-11-04 DIAGNOSIS — Z11.59 ENCOUNTER FOR HEPATITIS C SCREENING TEST FOR LOW RISK PATIENT: ICD-10-CM

## 2024-11-04 DIAGNOSIS — I10 HYPERTENSION, UNSPECIFIED TYPE: ICD-10-CM

## 2024-11-04 LAB
ALBUMIN SERPL-MCNC: 3.6 G/DL (ref 3.4–4.8)
ALBUMIN/GLOB SERPL: 1.2 RATIO (ref 1.1–2)
ALP SERPL-CCNC: 80 UNIT/L (ref 40–150)
ALT SERPL-CCNC: 31 UNIT/L (ref 0–55)
ANION GAP SERPL CALC-SCNC: 4 MEQ/L
AST SERPL-CCNC: 28 UNIT/L (ref 5–34)
BASOPHILS # BLD AUTO: 0.06 X10(3)/MCL
BASOPHILS NFR BLD AUTO: 0.8 %
BILIRUB SERPL-MCNC: 0.5 MG/DL
BUN SERPL-MCNC: 17.3 MG/DL (ref 8.4–25.7)
CALCIUM SERPL-MCNC: 9.2 MG/DL (ref 8.8–10)
CHLORIDE SERPL-SCNC: 104 MMOL/L (ref 98–107)
CHOLEST SERPL-MCNC: 213 MG/DL
CHOLEST/HDLC SERPL: 5 {RATIO} (ref 0–5)
CO2 SERPL-SCNC: 30 MMOL/L (ref 23–31)
CREAT SERPL-MCNC: 0.97 MG/DL (ref 0.72–1.25)
CREAT/UREA NIT SERPL: 18
EOSINOPHIL # BLD AUTO: 0.51 X10(3)/MCL (ref 0–0.9)
EOSINOPHIL NFR BLD AUTO: 6.6 %
ERYTHROCYTE [DISTWIDTH] IN BLOOD BY AUTOMATED COUNT: 13.8 % (ref 11.5–17)
GFR SERPLBLD CREATININE-BSD FMLA CKD-EPI: >60 ML/MIN/1.73/M2
GLOBULIN SER-MCNC: 3.1 GM/DL (ref 2.4–3.5)
GLUCOSE SERPL-MCNC: 84 MG/DL (ref 82–115)
HCT VFR BLD AUTO: 46.3 % (ref 42–52)
HCV AB SERPL QL IA: NONREACTIVE
HDLC SERPL-MCNC: 44 MG/DL (ref 35–60)
HGB BLD-MCNC: 16 G/DL (ref 14–18)
HIV 1+2 AB+HIV1 P24 AG SERPL QL IA: NONREACTIVE
IMM GRANULOCYTES # BLD AUTO: 0.05 X10(3)/MCL (ref 0–0.04)
IMM GRANULOCYTES NFR BLD AUTO: 0.7 %
LDLC SERPL CALC-MCNC: 125 MG/DL (ref 50–140)
LYMPHOCYTES # BLD AUTO: 1.48 X10(3)/MCL (ref 0.6–4.6)
LYMPHOCYTES NFR BLD AUTO: 19.2 %
MCH RBC QN AUTO: 32.1 PG (ref 27–31)
MCHC RBC AUTO-ENTMCNC: 34.6 G/DL (ref 33–36)
MCV RBC AUTO: 93 FL (ref 80–94)
MONOCYTES # BLD AUTO: 0.85 X10(3)/MCL (ref 0.1–1.3)
MONOCYTES NFR BLD AUTO: 11.1 %
NEUTROPHILS # BLD AUTO: 4.74 X10(3)/MCL (ref 2.1–9.2)
NEUTROPHILS NFR BLD AUTO: 61.6 %
NRBC BLD AUTO-RTO: 0 %
PLATELET # BLD AUTO: 143 X10(3)/MCL (ref 130–400)
PLATELETS.RETICULATED NFR BLD AUTO: 2.4 % (ref 0.9–11.2)
PMV BLD AUTO: 9.7 FL (ref 7.4–10.4)
POTASSIUM SERPL-SCNC: 4.4 MMOL/L (ref 3.5–5.1)
PROT SERPL-MCNC: 6.7 GM/DL (ref 5.8–7.6)
RBC # BLD AUTO: 4.98 X10(6)/MCL (ref 4.7–6.1)
SODIUM SERPL-SCNC: 138 MMOL/L (ref 136–145)
TRIGL SERPL-MCNC: 218 MG/DL (ref 34–140)
TSH SERPL-ACNC: 1.84 UIU/ML (ref 0.35–4.94)
VLDLC SERPL CALC-MCNC: 44 MG/DL
WBC # BLD AUTO: 7.69 X10(3)/MCL (ref 4.5–11.5)

## 2024-11-04 PROCEDURE — 87389 HIV-1 AG W/HIV-1&-2 AB AG IA: CPT

## 2024-11-04 PROCEDURE — 85025 COMPLETE CBC W/AUTO DIFF WBC: CPT

## 2024-11-04 PROCEDURE — 86803 HEPATITIS C AB TEST: CPT

## 2024-11-04 PROCEDURE — 36415 COLL VENOUS BLD VENIPUNCTURE: CPT

## 2024-11-04 PROCEDURE — 80053 COMPREHEN METABOLIC PANEL: CPT

## 2024-11-04 PROCEDURE — 84443 ASSAY THYROID STIM HORMONE: CPT

## 2024-11-04 PROCEDURE — 99214 OFFICE O/P EST MOD 30 MIN: CPT | Mod: PBBFAC

## 2024-11-04 PROCEDURE — 80061 LIPID PANEL: CPT

## 2024-11-04 RX ORDER — ATORVASTATIN CALCIUM 20 MG/1
20 TABLET, FILM COATED ORAL DAILY
Qty: 90 TABLET | Refills: 3 | Status: SHIPPED | OUTPATIENT
Start: 2024-11-04 | End: 2025-11-04

## 2024-11-04 RX ORDER — LORAZEPAM 1 MG/1
1 TABLET ORAL 2 TIMES DAILY PRN
COMMUNITY
Start: 2024-06-05

## 2024-11-04 RX ORDER — UMECLIDINIUM 62.5 UG/1
62.5 AEROSOL, POWDER ORAL DAILY
COMMUNITY

## 2024-11-04 NOTE — TELEPHONE ENCOUNTER
Called patient, identified with . Went over fatigue labwork and lipid lab work with patient. Current ASCVD risk is 10.8%, given patient hx of CAD and CHF shared decision making to start high intensity stating, Lipitor 20mg, daily. Patient will  prescription and begin taking medication. Side effects discussed of rare myalgia, myopathy, and rhabdomyolysis, patient to call back if any issues with taking medication. All other questions and concerns addressed.

## 2024-11-04 NOTE — PROGRESS NOTES
[facility] OFFICE VISIT NOTE  Lazaro Pacheco  [id number]  11/04/2024    Chief Complaint   Patient presents with    Follow-up    Fatigue     Patient complaining of feeling fatigue.        HPI  Lazaro Pacheco is a 63 y.o. male  presenting to [facility] for 3 month follow up. Pmhx of pulmonary emphysema, CHF (EF 20% in 2022 echo), HTN, ASCVD risk of 12.5% not on statin. Patient reports continued chronic fatigue, thinks cause due to chronic dyspnea 2/2 to COPD. Patient follows with pulmonologist, recent CTA chest with stable 4.5 cm thoracic aorta since 2022, and no suspicious lung masses present. Follows with [facility] for cardiology, will need to get records. Counseled patient on ASCVD risk of 12.5% based on lipid panel on 1/2023, counseled on starting high intensity statin, patient amenable to lab work today. Given history of COPD and emphysema, counseled on importance of vaccinations for pneumo, RSV, and flu, patient deferred at this time due to hesitation due to family history of flu vaccine reaction. Reports stable chronic dyspnea, consistent with inhaler regiment. Will obtain further labs for work up of fatigue, most recent labs 2 years prior. Weight has been stable at 135lb. Follows NP for psychiatric use, currently on Lorazepam 2mg and trazodone 50mg nightly, counseled on respiratory depression of lorazepam, will taper to 1 mg nightly to reach lowest efficacious dose. Denies fever, n/v, CP, SOB, or abdominal pain.       Review of System  Pertinent review of system noted in HPI above    Current Medications:   Current Outpatient Medications   Medication Instructions    albuterol (PROVENTIL/VENTOLIN HFA) 90 mcg/actuation inhaler Inhalation    budesonide-formoterol 160-4.5 mcg (SYMBICORT) 160-4.5 mcg/actuation HFAA Inhalation    furosemide (LASIX) 20 mg, Oral    INCRUSE ELLIPTA 62.5 mcg, Inhalation, Daily    lisinopriL (PRINIVIL,ZESTRIL) 5 mg, Oral    LORazepam (ATIVAN) 2 mg, Oral, Nightly    LORazepam (ATIVAN) 1 mg, 2 times daily  "PRN    metoprolol succinate (TOPROL-XL) 25 mg, Oral, 2 times daily    traZODone (DESYREL) 25-50 mg, Oral, Nightly       Blood pressure 99/64, pulse 77, temperature 97.5 °F (36.4 °C), temperature source Oral, height 5' 4.02" (1.626 m), weight 61.3 kg (135 lb 3.2 oz), SpO2 (!) 94%.     Physical Exam:  General: Appears comfortable  HEENT: NC/AT  Neck:  No JVD  CVS: Regular rhythm. Normal S1/S2. No murmurs on auscultation  Pulmonary: clear to auscultation bilaterally, no wheezing, no increased WOB  Abdomen: nondistended, normoactive BS, soft and non-tender.  MSK: No obvious deformity or joint swelling  Skin: Warm and dry  Neuro: AAOx3, no focal neurological deficit. CN II-XII grossly intact   Psych: Cooperative        Assessment:   1. Chronic fatigue    2. Hypertension, unspecified type    3. Chronic prescription benzodiazepine use    4. Encounter for screening for HIV    5. Encounter for hepatitis C screening test for low risk patient        Plan:    Chronic fatigue, 2/2 COPD and Emphysema  Hx of tobacco use 30 pack year, quit in 2015  -consistent with inhaler treatment, denies worsening of respiratory effort.  -recent CTA chest, no suspicious lung nodules, no low dose CT chest at this time  -Will need records from Pulmonologist, records release consents needed  -Labs for fatigue: CBC, CMP, TSH    CAD/CHF/HTN  -HTN well controlled on current regiment, 99/64 this visit  -Currently taking Toprol 25mg BID, Lasix 20mg daily, and Lisinopril 5mg daily, tolerating well  -ASCVD risk from lipid panel on 1/2023 is 12.5%, counseled on high intensity statin, will need recent lab work prior to starting  -CMP for liver function, Lipid panel ordered  -Will call patient with results, if ASCVD >10% will consider starting Lipitor 20mg daily pending lab results  -Consents for release of records from Cardiologist, will obtain records    Chronic Benzodiazepine use in 64 yo and hx of respiratory disease  -counseled on tapering Lorazepam 2mg " to 1mg nightly to obtain lowest efficacious dose  -counseled on importance of decreasing medication dose 2/2 to hx of COPD, DUSTY, and emphysema  -Will decrease to 1 mg nightly and report back with any sleep difficulties    Encounter for HIV screening  -Lab for HIV 1/2 Ag/Ab    Encounter for Hep C screening  -Lab for HEP C antibody    Immunizations due  -Counseled on importance of vaccinations due to hx of respiratory disease, patient will defer at this time for pnuemo, RSV, flu and COVID. Reports father had bad flu vaccine reaction, will continue to  on obtaining vaccinations at next visit in 2 months.      Orders Placed This Encounter    HIV 1/2 Ag/Ab (4th Gen)    Hepatitis C Antibody    Comprehensive Metabolic Panel    CBC Auto Differential    TSH    Lipid Panel    CBC with Differential       Return to clinic in 2 month for HLD, or sooner if needed.     Indiana Puga  [facility] Resident HO-[facility]

## 2025-01-13 ENCOUNTER — OFFICE VISIT (OUTPATIENT)
Dept: FAMILY MEDICINE | Facility: CLINIC | Age: 65
End: 2025-01-13
Payer: MEDICARE

## 2025-01-13 VITALS
HEART RATE: 69 BPM | WEIGHT: 136.38 LBS | OXYGEN SATURATION: 96 % | BODY MASS INDEX: 23.28 KG/M2 | HEIGHT: 64 IN | TEMPERATURE: 98 F | SYSTOLIC BLOOD PRESSURE: 120 MMHG | DIASTOLIC BLOOD PRESSURE: 76 MMHG

## 2025-01-13 DIAGNOSIS — E78.5 HYPERLIPIDEMIA, UNSPECIFIED HYPERLIPIDEMIA TYPE: Primary | ICD-10-CM

## 2025-01-13 DIAGNOSIS — Z87.891 HX OF TOBACCO USE, PRESENTING HAZARDS TO HEALTH: ICD-10-CM

## 2025-01-13 PROCEDURE — 99214 OFFICE O/P EST MOD 30 MIN: CPT | Mod: PBBFAC

## 2025-01-13 RX ORDER — ROSUVASTATIN CALCIUM 20 MG/1
20 TABLET, COATED ORAL DAILY
Qty: 90 TABLET | Refills: 3 | Status: SHIPPED | OUTPATIENT
Start: 2025-01-13 | End: 2026-01-13

## 2025-01-13 NOTE — PROGRESS NOTES
"[facility] OFFICE VISIT NOTE  Lazaro Pacheco  [id number]  01/13/2025    Chief Complaint   Patient presents with    Follow-up    Hyperlipidemia       HPI  Lazaro aPcheco is a 64 y.o. male with pmhx of Pulmonary emphysema, CHF (EF 20% echo 2022), HTN presenting to [facility] for follow up on HLD recently started on statin. Patient reports diffuse myopathy and pain with satin use, discontinued atorvastatin 20mg 3 days prior with improvement of symptoms. Given extensive hx of COPD, emphysema, counseled importance of vaccinations at this time for pneumo, RSV, and flu. Patient deferred at this time due to personal hx of father passing away after flu vaccination. Reports stable chronic dyspnea, adherent to current inhaler regiment. Follows with cardiologist and pulmonologist. Denies fever, n/v, CP, worsened SOB, cough, or abdominal pain.       Review of System  Pertinent review of system noted in HPI above    Current Medications:   Current Outpatient Medications   Medication Instructions    albuterol (PROVENTIL/VENTOLIN HFA) 90 mcg/actuation inhaler Inhalation    budesonide-formoterol 160-4.5 mcg (SYMBICORT) 160-4.5 mcg/actuation HFAA Inhalation    furosemide (LASIX) 20 mg, Oral    INCRUSE ELLIPTA 62.5 mcg, Inhalation, Daily    lisinopriL (PRINIVIL,ZESTRIL) 5 mg, Oral    LORazepam (ATIVAN) 1 mg, 2 times daily PRN    metoprolol succinate (TOPROL-XL) 25 mg, Oral, 2 times daily    rosuvastatin (CRESTOR) 20 mg, Oral, Daily    traZODone (DESYREL) 25-50 mg, Oral, Nightly       Blood pressure 120/76, pulse 69, temperature 98.1 °F (36.7 °C), temperature source Oral, height 5' 4" (1.626 m), weight 61.9 kg (136 lb 6.4 oz), SpO2 96%.     Physical Exam:  General: Appears comfortable  HEENT: NC/AT  CVS: Regular rhythm. Normal S1/S2. No murmurs on exam  Pulmonary: clear to auscultation bilaterally, no wheezing  Abdomen: nondistended, normoactive BS, soft and non-tender.  MSK: No obvious deformity or joint swelling  Skin: Warm and dry  Neuro: AAOx3, " no focal neurological deficit. CN II-XII grossly intact   Psych: Cooperative        Assessment:   1. Hyperlipidemia, unspecified hyperlipidemia type    2. Hx of tobacco use, presenting hazards to health        Plan:    HLD  Hx of CHF, COPD, Thoracic Aorta Aneurysm   -ASCVD risk 12.5% with multiple comorbidity    -Previously on Atorvastatin 20mg, but developed myalgia and arthralgia, stopped 3 days prior  -switch to Rosuvastatin 20mg, precautions discussed     Hx tobacco use  -US Abdominal Aorta ordered    Orders Placed This Encounter    US Abdominal Aorta    rosuvastatin (CRESTOR) 20 MG tablet       Return to clinic in 3 months for follow up, or sooner if needed.     Indiana Puga  [facility] Resident HO-1

## 2025-01-14 NOTE — PROGRESS NOTES
Discussed with resident at time of encounter 01-13-25.  Pt. seen.  Developed myalgias with atorvastatin.  Hx. of ascending thoracic aneurysm.     Resident's note reviewed 01-13-25.  Agree with assessment; plan of care appropriate.  Closely monitor for adverse medication effects.  Professional services provided in an outpatient primary care center affiliated with a [facility] institution.

## 2025-04-17 ENCOUNTER — OFFICE VISIT (OUTPATIENT)
Dept: FAMILY MEDICINE | Facility: CLINIC | Age: 65
End: 2025-04-17
Payer: MEDICARE

## 2025-04-17 VITALS
TEMPERATURE: 98 F | BODY MASS INDEX: 22.85 KG/M2 | HEART RATE: 67 BPM | HEIGHT: 64 IN | OXYGEN SATURATION: 94 % | DIASTOLIC BLOOD PRESSURE: 72 MMHG | SYSTOLIC BLOOD PRESSURE: 113 MMHG | WEIGHT: 133.81 LBS

## 2025-04-17 DIAGNOSIS — E78.5 HYPERLIPIDEMIA, UNSPECIFIED HYPERLIPIDEMIA TYPE: Primary | ICD-10-CM

## 2025-04-17 DIAGNOSIS — Z23 NEED FOR PNEUMOCOCCAL VACCINATION: ICD-10-CM

## 2025-04-17 PROBLEM — F17.200 NICOTINE DEPENDENCE: Status: ACTIVE | Noted: 2025-04-17

## 2025-04-17 PROBLEM — R59.1 LYMPHADENOPATHY: Status: ACTIVE | Noted: 2025-04-17

## 2025-04-17 PROCEDURE — 99214 OFFICE O/P EST MOD 30 MIN: CPT | Mod: PBBFAC

## 2025-04-17 RX ORDER — METOPROLOL TARTRATE 25 MG/1
25 TABLET, FILM COATED ORAL 2 TIMES DAILY
COMMUNITY
Start: 2025-04-14

## 2025-04-17 RX ORDER — BUDESONIDE AND FORMOTEROL FUMARATE DIHYDRATE 160; 4.5 UG/1; UG/1
2 AEROSOL RESPIRATORY (INHALATION) EVERY 12 HOURS
COMMUNITY
Start: 2024-03-26

## 2025-04-17 RX ORDER — DIMENHYDRINATE 50 MG
100 TABLET ORAL DAILY
COMMUNITY
Start: 2025-04-14

## 2025-04-17 RX ORDER — TIOTROPIUM BROMIDE INHALATION SPRAY 3.12 UG/1
2 SPRAY, METERED RESPIRATORY (INHALATION) DAILY
COMMUNITY
Start: 2025-02-10

## 2025-04-17 NOTE — PROGRESS NOTES
[facility] OFFICE VISIT NOTE  Lazaro Pacheco  [id number]  04/17/2025    Chief Complaint   Patient presents with    Follow-up     3 month follow up. COPD        HPI  Lazaro Pacheco is a 64 y.o. male pmhx of COPD, DUSTY(CPAP), CHF(EF 54%), aortic valve defect, HTN presenting to [facility] for COPD and HLD follow up. Had arthropathy on bilateral hand joints with Rosuvastatin 20mg, had stopped taking 3 weeks ago. Recently seen Cardiologist with [facility] and recommended taking CoQ10 and restarting statin in 1 week. Recent repeat ECHO with stable EF and stable Aortic aneurysm size of 4.5cm. reports they completed an abdominal aorta scan, reports normal results. Recently had Covid 3 months prior, recovered. Denies fever, n/v, CP, SOB, abdominal pain.    Spoke about importance of vaccinations including pneumonia, shingles, and tetanus. Patient hesitance due to hx of father passing away after flu vaccine in past. Risk and benefits discussed, patient wishes to delay vaccines at this time.       Review of System  Pertinent review of system noted in HPI above    Current Medications:   Current Outpatient Medications   Medication Instructions    albuterol (PROVENTIL/VENTOLIN HFA) 90 mcg/actuation inhaler Inhalation    budesonide-formoterol 160-4.5 mcg (BREYNA) 160-4.5 mcg/actuation HFAA 2 puffs, Every 12 hours    budesonide-formoterol 160-4.5 mcg (SYMBICORT) 160-4.5 mcg/actuation HFAA Inhalation    CO Q-10 100 mg, Daily    furosemide (LASIX) 20 mg, Oral    INCRUSE ELLIPTA 62.5 mcg, Inhalation, Daily    lisinopriL (PRINIVIL,ZESTRIL) 5 mg, Oral    LORazepam (ATIVAN) 1 mg, 2 times daily PRN    metoprolol succinate (TOPROL-XL) 25 mg, Oral, 2 times daily    metoprolol tartrate (LOPRESSOR) 25 mg, 2 times daily    rosuvastatin (CRESTOR) 20 mg, Oral, Daily    SPIRIVA RESPIMAT 2.5 mcg/actuation inhaler 2 puffs, Daily    traZODone (DESYREL) 25-50 mg, Oral, Nightly       Blood pressure 113/72, pulse 67, temperature 97.9 °F (36.6 °C), temperature source  "Oral, height 5' 4.02" (1.626 m), weight 60.7 kg (133 lb 12.8 oz), SpO2 (!) 94%.     Physical Exam:  General: Appears comfortable  HEENT: NC/AT  Neck:  No JVD  CVS: Regular rhythm. Normal S1/S2.  Pulmonary: clear to auscultation bilaterally, no wheezing  Abdomen: nondistended, normoactive BS, soft and non-tender.  MSK: No obvious deformity or joint swelling  Skin: Warm and dry  Neuro: AAOx3, no focal neurological deficit. CN II-XII grossly intact   Psych: Cooperative        Assessment:   1. Hyperlipidemia, unspecified hyperlipidemia type    2. Need for pneumococcal vaccination        Plan:  HLD  -side effect to Rosuvastatin 20mg, similar to previous atorvastatin 20mg, discontinued 3 weeks ago  -Will attempt CoQ10 prior to restarting in 1 week  -Counseled on red rice yeast extract while in between restarting statin  -ASCVD 12.5%     Need for Pneumo vaccine  -patient with vaccine hesitancy due to hx of father passing away s/p flu vaccine  -risk and benefits discussed with multiple vaccines including pneumo, shingles, Covid, and Tetanus  -patient will defer at his time         Return to clinic in 3 months for HLD follow up, or sooner if needed.     Indiana Puga  [facility] Resident HO-1    "

## 2025-08-04 ENCOUNTER — OFFICE VISIT (OUTPATIENT)
Dept: FAMILY MEDICINE | Facility: CLINIC | Age: 65
End: 2025-08-04
Payer: MEDICARE

## 2025-08-04 VITALS
TEMPERATURE: 98 F | DIASTOLIC BLOOD PRESSURE: 63 MMHG | BODY MASS INDEX: 23.11 KG/M2 | SYSTOLIC BLOOD PRESSURE: 102 MMHG | HEIGHT: 64 IN | HEART RATE: 98 BPM | RESPIRATION RATE: 18 BRPM | OXYGEN SATURATION: 98 % | WEIGHT: 135.38 LBS

## 2025-08-04 DIAGNOSIS — H53.8 BLURRY VISION, RIGHT EYE: Primary | ICD-10-CM

## 2025-08-04 DIAGNOSIS — E78.5 HYPERLIPIDEMIA, UNSPECIFIED HYPERLIPIDEMIA TYPE: ICD-10-CM

## 2025-08-04 DIAGNOSIS — J43.9 PULMONARY EMPHYSEMA, UNSPECIFIED EMPHYSEMA TYPE: ICD-10-CM

## 2025-08-04 DIAGNOSIS — M79.10 MUSCLE PAIN: ICD-10-CM

## 2025-08-04 DIAGNOSIS — Z28.21 VACCINATION NOT CARRIED OUT BECAUSE OF PATIENT REFUSAL: ICD-10-CM

## 2025-08-04 PROCEDURE — 99214 OFFICE O/P EST MOD 30 MIN: CPT | Mod: PBBFAC

## 2025-08-04 NOTE — PROGRESS NOTES
"  [facility] OFFICE VISIT NOTE  Lazaro Pacheco  [id number]  08/04/2025    Chief Complaint   Patient presents with    Follow-up     No new complaints. Continues with shortness of breath.       HPI  Lazaro Pacheco is a 64 y.o. male pmhx of COPD, DUSTY(CPAP), CHF(EF 54%), aortic valve defect, Aortic aneurysm, HTN presenting to [facility] for COPD and HLD follow up. Did not tolerate high intensity statin. Reports worsening right eye blurry for several months after starting the statin, has been stable blurry. Reports still able to see stops signs, speed signs. Blurriness stable whether objects are far or close. Will follow up with optometrist. Denies fever, n/v, CP, SOB, abdominal pain.    Spoke about importance of vaccinations including pneumonia, shingles, and tetanus. Patient hesitance due to hx of father passing away after flu vaccine in past. Risk and benefits discussed, patient wishes to delay vaccines at this time.       Review of System  Pertinent review of system noted in HPI above    Current Medications:   Current Outpatient Medications   Medication Instructions    albuterol (PROVENTIL/VENTOLIN HFA) 90 mcg/actuation inhaler Inhalation    budesonide-formoterol 160-4.5 mcg (BREYNA) 160-4.5 mcg/actuation HFAA 2 puffs, Every 12 hours    budesonide-formoterol 160-4.5 mcg (SYMBICORT) 160-4.5 mcg/actuation HFAA Inhalation    CO Q-10 100 mg, Daily    furosemide (LASIX) 20 mg, Oral    lisinopriL (PRINIVIL,ZESTRIL) 5 mg, Oral    LORazepam (ATIVAN) 1 mg, 2 times daily PRN    metoprolol succinate (TOPROL-XL) 25 mg, Oral, 2 times daily    SPIRIVA RESPIMAT 2.5 mcg/actuation inhaler 2 puffs, Daily    traZODone (DESYREL) 25-50 mg, Oral, Nightly       Blood pressure 102/63, pulse 98, temperature 97.8 °F (36.6 °C), temperature source Oral, resp. rate 18, height 5' 4.02" (1.626 m), weight 61.4 kg (135 lb 6.4 oz), SpO2 98%.     Physical Exam:  General: Appears comfortable  HEENT: NC/AT , no nystagmus, bilateral pupils equal and reactive to " light, extraocular movements intact bilaterally. Bilateral TM visualized normal finding  CVS: Regular rhythm. Normal S1/S2.  Pulmonary: clear to auscultation bilaterally, no wheezing  Abdomen: nondistended, normoactive BS, soft and non-tender.  MSK: No obvious deformity or joint swelling  Skin: Warm and dry  Neuro: AAOx3, no focal neurological deficit. CN II-XII grossly intact   Psych: Cooperative        Assessment:   1. Blurry vision, right eye    2. Hyperlipidemia, unspecified hyperlipidemia type    3. Muscle pain    4. Pulmonary emphysema, unspecified emphysema type    5. Vaccination not carried out because of patient refusal          Plan:  Right eye blurriness  -reports worsening right eye blurry vision after starting statin therapy, has been stable  -visual acuity completed 20/70 on right and 20/40 on left with prescription glasses  -recommended follow up with optometrist as soon as possible  -reports hx of floaters and curtain falling on right eye in past    HLD  -not tolerating rosuvastatin or atorvastatin, attempted coq10 but no improvement, patient not amenable for different statin. Will discontinue rosuvastatin at this time  -Counseled on red rice yeast extract   -ASCVD 12.5%, will attempt lifestyle/dietary changes    COPD  -stable, follows with pulmonology  -adherent to CPAP and to maintenance inhaler therapy including Budesonide-formoterol, Spiriva, and rescue albuterol    Need for vaccine  -patient with vaccine hesitancy due to hx of father passing away s/p flu vaccine  -risk and benefits discussed with multiple vaccines including pneumo, shingles, Covid, and Tetanus  -patient will defer at his time    Orders Placed This Encounter    Visual acuity screening       Return to clinic in 6 months for annual follow up, or sooner if needed.     Indiana Puga MD  [facility] Resident HO-2